# Patient Record
Sex: FEMALE | URBAN - NONMETROPOLITAN AREA
[De-identification: names, ages, dates, MRNs, and addresses within clinical notes are randomized per-mention and may not be internally consistent; named-entity substitution may affect disease eponyms.]

---

## 2022-01-25 ENCOUNTER — APPOINTMENT (RX ONLY)
Dept: URBAN - NONMETROPOLITAN AREA CLINIC 7 | Facility: CLINIC | Age: 37
Setting detail: DERMATOLOGY
End: 2022-01-25

## 2022-01-25 DIAGNOSIS — L57.8 OTHER SKIN CHANGES DUE TO CHRONIC EXPOSURE TO NONIONIZING RADIATION: ICD-10-CM

## 2022-01-25 DIAGNOSIS — D22 MELANOCYTIC NEVI: ICD-10-CM

## 2022-01-25 PROBLEM — D22.5 MELANOCYTIC NEVI OF TRUNK: Status: ACTIVE | Noted: 2022-01-25

## 2022-01-25 PROCEDURE — ? ADDITIONAL NOTES

## 2022-01-25 PROCEDURE — ? COUNSELING

## 2022-01-25 PROCEDURE — 99203 OFFICE O/P NEW LOW 30 MIN: CPT

## 2022-01-25 ASSESSMENT — LOCATION ZONE DERM
LOCATION ZONE: TRUNK
LOCATION ZONE: ARM

## 2022-01-25 ASSESSMENT — LOCATION SIMPLE DESCRIPTION DERM
LOCATION SIMPLE: RIGHT LOWER BACK
LOCATION SIMPLE: LEFT UPPER ARM

## 2022-01-25 ASSESSMENT — LOCATION DETAILED DESCRIPTION DERM
LOCATION DETAILED: RIGHT SUPERIOR MEDIAL MIDBACK
LOCATION DETAILED: LEFT ANTERIOR PROXIMAL UPPER ARM

## 2022-01-25 NOTE — PROCEDURE: ADDITIONAL NOTES
Subjective:      Patient ID: Robi Simeon is a 58 y.o. male.    Chief Complaint: follow up      HPI     Mr. Robi Simeon is a patient of Denilson Hills MD, who presents for follow up  on DM2 & HTN.    He reports a pressure in his left chest on some mornings when waking up, particularly on weekends, lasting up to 1 hour, which he has attributed to gas. He had a NM stress test with his cardiologist, Dr. Obregon, last year, which was normal. No associated shortness of breath or dizziness or headache or blurry vision or abdominal pain or nausea or diaphoresis.     VS, labs & imaging reviewed and discussed with patient, including +9 lb weight gain from 7/30/19-10/31/19 & +9 lb wt gain from 10/31/19-1/30/20;  He continues to go to the gym, still drinking 100 oz H2O/day and doesn't eat particularly large portions compared to his previous portions; A1c 6.2% on 10/31/19 (A1c 6.4% & HDL 31 on 1/30/19).     Of note, EPIC indicates due preventive measures, including HIV shingles, FLP.       Past Medical History:   Diagnosis Date    Diabetes mellitus type 2 in obese     metformin 500 bid; NM stress WNL per pt in 3/2019    Hyperlipidemia     Hypertension     PVC (premature ventricular contraction)     Stable, per Dr. Ivan Pulido at [facility], who reported pt to have NM stress test that was neg for reversibility and ECHO with normal EF on note dated 4/1/19    Thalassemia     Will obtain records from Dr. Mccarty in  at [facility]; manifests as slightly high RBC w/ slightly low Hgb     History reviewed. No pertinent surgical history.  Social History     Socioeconomic History    Marital status:      Spouse name: Not on file    Number of children: Not on file    Years of education: Not on file    Highest education level: Not on file   Occupational History    Not on file   Social Needs    Financial resource strain: Not on file    Food insecurity:     Worry: Not on file     Inability: Not on file    Transportation 
needs:     Medical: Not on file     Non-medical: Not on file   Tobacco Use    Smoking status: Never Smoker    Smokeless tobacco: Never Used   Substance and Sexual Activity    Alcohol use: Yes     Frequency: Monthly or less     Drinks per session: 1 or 2     Binge frequency: Never    Drug use: No    Sexual activity: Yes     Partners: Female     Birth control/protection: None     Comment:    Lifestyle    Physical activity:     Days per week: Not on file     Minutes per session: Not on file    Stress: Not on file   Relationships    Social connections:     Talks on phone: Not on file     Gets together: Not on file     Attends [employer] service: Not on file     Active member of club or organization: Not on file     Attends meetings of clubs or organizations: Not on file     Relationship status: Not on file   Other Topics Concern    Not on file   Social History Narrative    Patient goal(s): Get off uneccesary meds through diet, exercise; high wt 210 lbs in 2016; weight goal 150 lb by 7/30/2020        Motivation for goals (0-10): 9; quality time with grandson and family        Breakfast: 2 toast w/ jelly or jam or 2 pancakes w/ syrup or 1 hash browns; water or tea (less soda)    Lunch: Meat, vegetables, rice or potatoes; water    Dinner: 1 big bowl of gumbo: shrimp, chicken, sausage, rice; sweetened (Snapple) tea    Snacks: Dried fruits 2x/d    Eating out: 1-2x/mo: Seafood buffet w/ crab legs    Water (oz/day): 102 oz/day    Physical Activity: aerobic & resistance  min/d & 3d/wk     Family History   Problem Relation Age of Onset    Diabetes type II Mother        Current Outpatient Medications:     amLODIPine (NORVASC) 5 MG tablet, Take 1 tablet (5 mg total) by mouth once daily., Disp: 90 tablet, Rfl: 3    atorvastatin (LIPITOR) 20 MG tablet, Take 1 tablet (20 mg total) by mouth once daily., Disp: 90 tablet, Rfl: 3    bisoprolol-hydrochlorothiazide (ZIAC) 10-6.25 mg per tablet, Take 1 tablet by 
"mouth once daily., Disp: 90 tablet, Rfl: 3    blood sugar diagnostic Strp, 1 strip by Misc.(Non-Drug; Combo Route) route once daily., Disp: 100 strip, Rfl: 3    metFORMIN (GLUCOPHAGE) 500 MG tablet, Take 1 tablet (500 mg total) by mouth 2 (two) times daily., Disp: 60 tablet, Rfl: 11    olmesartan (BENICAR) 20 MG tablet, Take 1 tablet (20 mg total) by mouth once daily., Disp: 30 tablet, Rfl: 11    ONETOUCH DELICA LANCETS 30 gauge Misc, three times a week, Disp: 100 each, Rfl: 1    ONETOUCH VERIO Strp, three times a week, Disp: 100 each, Rfl: 11    blood-glucose meter Misc, 1 application by Misc.(Non-Drug; Combo Route) route every morning. for 1 day, Disp: 1 each, Rfl: 0    sodium,potassium,mag sulfates (SUPREP BOWEL PREP KIT) 17.5-3.13-1.6 gram SolR, As Directed., Disp: , Rfl:     Review of patient's allergies indicates:   Allergen Reactions    Sulfa (sulfonamide antibiotics) Swelling     Swelling of face and itching        Review of Systems   Constitutional: Negative for activity change and unexpected weight change.   HENT: Negative for hearing loss, rhinorrhea and trouble swallowing.    Eyes: Negative for discharge and visual disturbance.   Respiratory: Negative for chest tightness and wheezing.    Cardiovascular: Negative for chest pain and palpitations.   Gastrointestinal: Negative for blood in stool, constipation, diarrhea and vomiting.   Endocrine: Negative for polydipsia and polyuria.   Genitourinary: Negative for difficulty urinating, hematuria and urgency.   Musculoskeletal: Negative for arthralgias, joint swelling and neck pain.   Neurological: Negative for weakness and headaches.   Psychiatric/Behavioral: Negative for confusion and dysphoric mood.        Objective:     /76 (BP Location: Left arm, Patient Position: Sitting, BP Method: Large (Manual))   Pulse 63   Temp 97.5 °F (36.4 °C) (Temporal)   Ht 5' 5" (1.651 m)   Wt 91.7 kg (202 lb 0.8 oz)   SpO2 97%   BMI 33.62 kg/m²     Physical "
Exam  GEN: A&O fully, NAD  PSYC: Normal affect       Lab Results   Component Value Date    WBC 5.49 10/30/2018    HGB 13.9 (L) 10/30/2018    HCT 47.1 10/30/2018     10/30/2018    CHOL 107 (L) 01/30/2019    TRIG 42 01/30/2019    HDL 31 (L) 01/30/2019    LDLCALC 67.6 01/30/2019    ALT 26 10/31/2019    AST 26 10/31/2019     10/31/2019    K 4.0 10/31/2019     10/31/2019    CREATININE 1.1 10/31/2019    BUN 11 10/31/2019    CO2 28 10/31/2019    CALCIUM 9.9 10/31/2019    PSA 2.6 01/30/2019    HGBA1C 6.2 (H) 10/31/2019       Assessment:      1. Diabetes mellitus type 2 in obese: Well controlled. Will check A1c in 3 months.    2. Benign essential HTN: Stable. Continue current medical regimen.   3.      Obesity: Stage I. I encouraged the following permanent lifestyle modifications, particularly gradually            and systematically increasing physical activity (5-90 min/episode, 3-5 times/week), water            (1/2 of body weight in ounces) and avoiding potatoes, sugar sweetened beverages, red/processed             meats (including chicken), fast food, grains (rice/bread/pasta); and increasing yogurt, whole fruits,             unsalted nuts to 3-5 servings/day, and daily weight logging; non-starchy vegetables, cooked beans,             and un-fried seafood have weak effects on weight.  4.      CP: Likely GI given poor dietary choices on the weekends. Encouraged to f/u with his cardiologist or           Here if intensifies or is associated with shortness of breath or dizziness or headache or blurry vision            or abdominal pain or nausea or diaphoresis. Will obtain records from his cardiologist.      Plan:   Diabetes mellitus type 2 in obese  -     Hemoglobin A1c; Future; Expected date: 04/29/2020    Benign essential HTN  -     Hemoglobin A1c; Future; Expected date: 04/29/2020  -     CBC auto differential; Future; Expected date: 04/29/2020  -     Lipid panel; Future; Expected date: 04/29/2020  -    
 Vitamin D; Future; Expected date: 04/29/2020  -     Comprehensive metabolic panel; Future; Expected date: 04/29/2020    Prediabetes    Counseling on injury prevention  -     HIV 1/2 Ag/Ab (4th Gen); Future; Expected date: 04/29/2020    Chest pain, unspecified type      Follow up in about 3 months (around 4/30/2020), or if symptoms worsen or fail to improve, for FU on DM2, HTN, obesity.    I spent >25 minutes of time with patient 50% or more of which was discussing labs and plans of care.  
Additional Notes: Patient consent was obtained to proceed with the visit and recommended plan of care after discussion of all risks and benefits, including the risks of COVID-19 exposure.
Detail Level: Simple